# Patient Record
Sex: MALE
[De-identification: names, ages, dates, MRNs, and addresses within clinical notes are randomized per-mention and may not be internally consistent; named-entity substitution may affect disease eponyms.]

---

## 2020-08-02 ENCOUNTER — HOSPITAL ENCOUNTER (EMERGENCY)
Dept: HOSPITAL 97 - ER | Age: 65
Discharge: HOME | End: 2020-08-02
Payer: COMMERCIAL

## 2020-08-02 VITALS — TEMPERATURE: 98 F | DIASTOLIC BLOOD PRESSURE: 84 MMHG | SYSTOLIC BLOOD PRESSURE: 127 MMHG

## 2020-08-02 VITALS — OXYGEN SATURATION: 95 %

## 2020-08-02 DIAGNOSIS — K80.70: Primary | ICD-10-CM

## 2020-08-02 DIAGNOSIS — I10: ICD-10-CM

## 2020-08-02 DIAGNOSIS — Z88.1: ICD-10-CM

## 2020-08-02 LAB
ALBUMIN SERPL BCP-MCNC: 3.7 G/DL (ref 3.4–5)
ALP SERPL-CCNC: 78 U/L (ref 45–117)
ALT SERPL W P-5'-P-CCNC: 38 U/L (ref 12–78)
AST SERPL W P-5'-P-CCNC: 22 U/L (ref 15–37)
BUN BLD-MCNC: 8 MG/DL (ref 7–18)
GLUCOSE SERPLBLD-MCNC: 92 MG/DL (ref 74–106)
HCT VFR BLD CALC: 49.5 % (ref 39.6–49)
LIPASE SERPL-CCNC: 37 U/L (ref 73–393)
LYMPHOCYTES # SPEC AUTO: 1.9 K/UL (ref 0.7–4.9)
PMV BLD: 8.3 FL (ref 7.6–11.3)
POTASSIUM SERPL-SCNC: 3.9 MMOL/L (ref 3.5–5.1)
RBC # BLD: 5.67 M/UL (ref 4.33–5.43)

## 2020-08-02 PROCEDURE — 81003 URINALYSIS AUTO W/O SCOPE: CPT

## 2020-08-02 PROCEDURE — 80076 HEPATIC FUNCTION PANEL: CPT

## 2020-08-02 PROCEDURE — 82565 ASSAY OF CREATININE: CPT

## 2020-08-02 PROCEDURE — 85025 COMPLETE CBC W/AUTO DIFF WBC: CPT

## 2020-08-02 PROCEDURE — 36415 COLL VENOUS BLD VENIPUNCTURE: CPT

## 2020-08-02 PROCEDURE — 96375 TX/PRO/DX INJ NEW DRUG ADDON: CPT

## 2020-08-02 PROCEDURE — 83690 ASSAY OF LIPASE: CPT

## 2020-08-02 PROCEDURE — 99284 EMERGENCY DEPT VISIT MOD MDM: CPT

## 2020-08-02 PROCEDURE — 96361 HYDRATE IV INFUSION ADD-ON: CPT

## 2020-08-02 PROCEDURE — 80048 BASIC METABOLIC PNL TOTAL CA: CPT

## 2020-08-02 PROCEDURE — 74177 CT ABD & PELVIS W/CONTRAST: CPT

## 2020-08-02 PROCEDURE — 96374 THER/PROPH/DIAG INJ IV PUSH: CPT

## 2020-08-02 NOTE — ER
Nurse's Notes                                                                                     

 [facility]                                                                 

Name: Gary Valle                                                                                 

Age: 65 yrs                                                                                       

Sex: Male                                                                                         

: 1955                                                                                   

MRN: [id number]                                                                                   

Arrival Date: 2020                                                                          

Time: 15:52                                                                                       

Account#: [id number]                                                                            

Bed 13                                                                                            

Private MD:                                                                                       

Diagnosis: Calculus of gallbladder and bile duct without cholecystitis                            

                                                                                                  

Presentation:                                                                                     

                                                                                             

16:10 Chief complaint: Patient states: Nausea, diarrhea, light headed for 4 days. + gassy and ll1 

      acid reflux. + lightheaded and near syncope today. Light colored stools, no blood seen.     

      Coronavirus screen: Client denies travel out of the [place] in the last 14 days. diarrhea,     

      difficulty breathing, muscle pain, nausea, Client presents with at least one sign or        

      symptom that may indicate coronavirus-19. Standard/surgical mask placed on the client.      

      Ebola Screen: Patient denies travel to an Ebola-affected area in the 21 days before         

      illness onset. Initial Sepsis Screen: Does the patient meet any 2 criteria? No.             

      Patient's initial sepsis screen is negative. Risk Assessment: Do you want to hurt           

      yourself or someone else? Patient reports no desire to harm self or others. Onset of        

      symptoms was 2020.                                                                 

16:10 Method Of Arrival: Ambulatory                                                           ll1 

16:10 Acuity: MARIANELA 3                                                                           ll1 

17:59 Initial Sepsis Screen: Does the patient have a suspected source of infection? Yes:      ll1 

      Acute abdominal pain.                                                                       

                                                                                                  

Historical:                                                                                       

- Allergies:                                                                                      

16:14 Azithromycin;                                                                           ll1 

- PMHx:                                                                                           

16:14 Hypertension;                                                                           ll1 

                                                                                                  

- Immunization history:: Flu vaccine is not up to date.                                           

- Social history:: Smoking status: Patient denies any tobacco usage or history of.                

  Patient uses alcohol, only on a social basis. Patient/guardian denies using street              

  drugs, IV drugs.                                                                                

- Family history:: not pertinent.                                                                 

                                                                                                  

                                                                                                  

Screenin:58 Abuse screen: Denies threats or abuse. Nutritional screening: No deficits noted.        ll1 

      Tuberculosis screening: No symptoms or risk factors identified. Fall Risk IV access (20     

      points). Total Winter Fall Scale indicates No Risk (0-24 pts).                               

                                                                                                  

Assessment:                                                                                       

17:56 General: Appears in no apparent distress. Behavior is calm, cooperative, appropriate    ll1 

      for age. Pain: Complains of pain in upper abdomen Pain Quality of pain is described as      

      aching, Pain began 2-3 days ago. Neuro: Level of Consciousness is awake, alert, obeys       

      commands, Oriented to person, place, time, situation, Appropriate for age  are         

      equal bilaterally Moves all extremities. Full function Gait is steady, Speech is            

      normal, Facial symmetry appears normal, Pupils are PERRLA, Reports lightheaded, near        

      syncope feeling.. Cardiovascular: No deficits noted. Respiratory: No deficits noted.        

      GI: Abdomen is round Bowel sounds present X 4 quads. Abd is soft and non tender X 4         

      quads. Reports upper abdominal pain, bloating, diarrhea, gaseousness, indigestion,          

      nausea.                                                                                     

19:15 Reassessment: Patient and/or family updated on plan of care and expected duration. Pain ph  

      level reassessed. Patient denies pain at this time. Patient states feeling better.          

      Patient states symptoms have improved. Reassessment: Patient is alert, oriented x 3,        

      equal unlabored respirations, skin warm/dry/pink. General: Appears in no apparent           

      distress. Behavior is calm, cooperative.                                                    

                                                                                                  

Vital Signs:                                                                                      

16:10  / 85; Pulse 68; Resp 17; Temp 98.5; Pulse Ox 95% ; Weight 117.93 kg; Height 5    ll1 

      ft. 8 in. (172.72 cm); Pain 3/10;                                                           

19:15  / 84; Pulse 72; Resp 16; Temp 98.0(O); Pulse Ox 95% on R/A; Pain 0/10;           ph  

16:10 Body Mass Index 39.53 (117.93 kg, 172.72 cm)                                            ll1 

                                                                                                  

ED Course:                                                                                        

15:52 Patient arrived in ED.                                                                  fj1 

16:13 Triage completed.                                                                       ll1 

16:15 Arm band placed on Patient notified of wait time.                                       ll1 

17:05 Bebe Montelongo MD is Attending Physician.                                           ma2 

17:40 Inserted saline lock: 20 gauge in right antecubital area, using aseptic technique.      ll1 

      Blood collected.                                                                            

17:52 Azalea Harper, RN is Primary Nurse.                                                    ph  

18:18 CT Abd/Pelvis - IV Contrast Only In Process Unspecified.                                EDMS

19:02 Tristan Mauricio MD is Referral Physician.                                               ma2 

19:15 Bed in low position. Call light in reach. Side rails up X 1.                            ph  

19:15 No provider procedures requiring assistance completed. IV discontinued, intact,         ph  

      bleeding controlled, No redness/swelling at site. Pressure dressing applied.                

                                                                                                  

Administered Medications:                                                                         

17:55 Drug: NS 0.9% 2000 ml Route: IV; Rate: 1 bolus; Site: right antecubital;                ll1 

19:00 Follow up: Response: No adverse reaction; IV Status: Completed infusion; IV Intake:     ph  

      1000ml                                                                                      

17:56 Drug: Zofran (Ondansetron) 4 mg Route: IVP; Site: right antecubital;                    ll1 

19:00 Follow up: Response: No adverse reaction                                                ph  

17:56 Drug: Pepcid 20 mg Route: IVP; Site: right antecubital;                                 ll1 

19:00 Follow up: Response: No adverse reaction                                                ph  

                                                                                                  

                                                                                                  

Intake:                                                                                           

19:00 IV: 1000ml; Total: 1000ml.                                                              ph  

                                                                                                  

Outcome:                                                                                          

19:02 Discharge ordered by MD.                                                                ma2 

19:15 Discharged to home ambulatory.                                                          ph  

19:15 Condition: good                                                                             

19:15 Discharge instructions given to patient, Instructed on discharge instructions, follow       

      up and referral plans. medication usage, Demonstrated understanding of instructions,        

      follow-up care, medications, Prescriptions given X 2.                                       

19:17 Patient left the ED.                                                                    ph  

                                                                                                  

Signatures:                                                                                       

Dispatcher [employer]                           EDAzalea Brito RN                      RN   ph                                                   

Bebe Montelongo MD MD ma2 James, Frank                                 Tatiana Briceno RN                       RN   [facility]                                                  

                                                                                                  

**************************************************************************************************

## 2020-08-02 NOTE — EDPHYS
Physician Documentation                                                                           

 [facility]                                                                 

Name: Gary Valle                                                                                 

Age: 65 yrs                                                                                       

Sex: Male                                                                                         

: 1955                                                                                   

MRN: [id number]                                                                                   

Arrival Date: 2020                                                                          

Time: 15:52                                                                                       

Account#: [id number]                                                                            

Bed 13                                                                                            

Private MD:                                                                                       

ED Physician Bebe Montelongo                                                                    

HPI:                                                                                              

                                                                                             

17:26 This 65 yrs old  Male presents to ER via Ambulatory with complaints of Nausea,  ma2 

      Diarrhea, LIGHT HEADED.                                                                     

17:26 The patient presents to the emergency department with nausea, vomiting, diarrhea.       ma2 

      Onset: The symptoms/episode began/occurred gradually, 1 week(s) ago. Associated signs       

      and symptoms: Pertinent positives: abdominal pain, Pertinent negatives: belching,           

      diarrhea, fever, flatulence, GI bleeding. Severity of symptoms: At their worst the          

      symptoms were moderate in the emergency department the symptoms have improved. The          

      patient has not experienced similar symptoms in the past.                                   

                                                                                                  

Historical:                                                                                       

- Allergies:                                                                                      

16:14 Azithromycin;                                                                           ll1 

- PMHx:                                                                                           

16:14 Hypertension;                                                                           ll1 

                                                                                                  

- Immunization history:: Flu vaccine is not up to date.                                           

- Social history:: Smoking status: Patient denies any tobacco usage or history of.                

  Patient uses alcohol, only on a social basis. Patient/guardian denies using street              

  drugs, IV drugs.                                                                                

- Family history:: not pertinent.                                                                 

                                                                                                  

                                                                                                  

ROS:                                                                                              

17:26 Constitutional: Negative for fever, chills, and weight loss.                            ma2 

17:26 All other systems are negative.                                                             

                                                                                                  

Exam:                                                                                             

17:26 Constitutional:  This is a well developed, well nourished patient who is awake, alert,  ma2 

      and in no acute distress. ENT:  Nares patent. No nasal discharge, no septal                 

      abnormalities noted.  Tympanic membranes are normal and external auditory canals are        

      clear.  Oropharynx with no redness, swelling, or masses, exudates, or evidence of           

      obstruction, uvula midline.  Mucous membranes moist. Neck:  Trachea midline, no             

      thyromegaly or masses palpated, and no cervical lymphadenopathy.  Supple, full range of     

      motion without nuchal rigidity, or vertebral point tenderness.  No Meningismus.             

      Chest/axilla:  Normal chest wall appearance and motion.  Nontender with no deformity.       

      No lesions are appreciated. Cardiovascular:  Regular rate and rhythm with a normal S1       

      and S2.  No gallops, murmurs, or rubs.  Normal PMI, no JVD.  No pulse deficits.             

      Respiratory:  Lungs have equal breath sounds bilaterally, clear to auscultation and         

      percussion.  No rales, rhonchi or wheezes noted.  No increased work of breathing, no        

      retractions or nasal flaring. Abdomen/GI:  Soft, non-tender, with normal bowel sounds.      

      No distension or tympany.  No guarding or rebound.  No evidence of tenderness               

      throughout. Skin:  Warm, dry with normal turgor.  Normal color with no rashes, no           

      lesions, and no evidence of cellulitis. MS/ Extremity:  Pulses equal, no cyanosis.          

      Neurovascular intact.  Full, normal range of motion. Neuro:  Awake and alert, GCS 15,       

      oriented to person, place, time, and situation.  Cranial nerves II-XII grossly intact.      

      Motor strength 5/5 in all extremities.  Sensory grossly intact.  Cerebellar exam            

      normal.  Normal gait.                                                                       

                                                                                                  

Vital Signs:                                                                                      

16:10  / 85; Pulse 68; Resp 17; Temp 98.5; Pulse Ox 95% ; Weight 117.93 kg; Height 5    ll1 

      ft. 8 in. (172.72 cm); Pain 3/10;                                                           

19:15  / 84; Pulse 72; Resp 16; Temp 98.0(O); Pulse Ox 95% on R/A; Pain 0/10;           ph  

16:10 Body Mass Index 39.53 (117.93 kg, 172.72 cm)                                            ll1 

                                                                                                  

MDM:                                                                                              

17:05 Patient medically screened.                                                             [facility] 

17:26 Differential diagnosis: gastritis, cholecystitis, pancreatitis, appendicitis.           [facility] 

19:01 Data reviewed: vital signs, nurses notes. Counseling: I had a detailed discussion with  [facility] 

      the patient and/or guardian regarding: the historical points, exam findings, and any        

      diagnostic results supporting the discharge/admit diagnosis, the presence of at least       

      one elevated blood pressure reading (>120/80) during this emergency department visit,       

      the need for outpatient follow up. Response to treatment: the patient's symptoms have       

      resolved after treatment.                                                                   

                                                                                                  

                                                                                             

17:26 Order name: Basic Metabolic Panel; Complete Time: 18:20                                 [facility] 

                                                                                             

17:26 Order name: CBC with Diff; Complete Time: 18:49                                         [facility] 

                                                                                             

17: Order name: Hepatic Function; Complete Time: 18:20                                      [facility] 

                                                                                             

17:26 Order name: Lipase; Complete Time: 18:20                                                ma2 

                                                                                             

18:06 Order name: CREATININE WHOLE BLOOD; Complete Time: 18:20                                [facility]

                                                                                             

18:38 Order name: Urine Dipstick--Ancillary (enter results); Complete Time: 18:49             tt3 

                                                                                             

17:26 Order name: IV Saline Lock; Complete Time: 17:41                                        ma2 

                                                                                             

17:26 Order name: Labs collected and sent; Complete Time: 17:41                               ma2 

                                                                                             

17:26 Order name: CT Abd/Pelvis - IV Contrast Only; Complete Time: 18:49                      ma2 

                                                                                                  

Administered Medications:                                                                         

17:55 Drug: NS 0.9% 2000 ml Route: IV; Rate: 1 bolus; Site: right antecubital;                1 

19:00 Follow up: Response: No adverse reaction; IV Status: Completed infusion; IV Intake:     ph  

      1000ml                                                                                      

17:56 Drug: Zofran (Ondansetron) 4 mg Route: IVP; Site: right antecubital;                    ll1 

19:00 Follow up: Response: No adverse reaction                                                ph  

17:56 Drug: Pepcid 20 mg Route: IVP; Site: right antecubital;                                 ll1 

19:00 Follow up: Response: No adverse reaction                                                ph  

                                                                                                  

                                                                                                  

Disposition:                                                                                      

20 19:02 Discharged to Home. Impression: Calculus of gallbladder and bile duct without      

  cholecystitis.                                                                                  

- Condition is Stable.                                                                            

- Discharge Instructions: Cholelithiasis, Easy-to-Read.                                           

- Prescriptions for Zofran 4 mg Oral Tablet - take 1 tablet by ORAL route every 12                

  hours As needed; 20 tablet. Diclofenac Sodium 75 mg Oral Tablet Sustained Release -             

  take 1 tablet by ORAL route 2 times per day; 30 tablet.                                         

- Medication Reconciliation Form, Thank You Letter, Antibiotic Education, Prescription            

  Opioid Use form.                                                                                

- Follow up: Tristan Mauricio MD; When: Tomorrow; Reason: If symptoms return,                      

  Continuance of care.                                                                            

                                                                                                  

                                                                                                  

                                                                                                  

Signatures:                                                                                       

Dispatcher [employer]                           [facility]                                                 

Azalea Harper RN                      RN   ph                                                   

Bebe Montelongo MD MD   ma2                                                  

Tatiana Wiggins RN                       RN   ll1                                                  

                                                                                                  

Corrections: (The following items were deleted from the chart)                                    

19:17 19:02 2020 19:02 Discharged to Home. Impression: Calculus of gallbladder and bile ph  

      duct without cholecystitis. Condition is Stable. Forms are Medication Reconciliation        

      Form, Thank You Letter, Antibiotic Education, Prescription Opioid Use. Follow up: Tristan Mauricio; When: Tomorrow; Reason: If symptoms return, Continuance of care. ma2               

                                                                                                  

**************************************************************************************************

## 2020-08-02 NOTE — RAD REPORT
EXAM DESCRIPTION:  CT - Abdomen   Pelvis W Contrast - 8/2/2020 6:18 pm

 

CLINICAL HISTORY:  Abdominal pain

 

COMPARISON:  none.

 

TECHNIQUE:  Computed axial tomography of the abdomen pelvis was obtained. 100 cc Isovue-300 was admin
istered intravenously. Oral contrast was not requested which limits evaluation of bowel.

 

All CT scans are performed using dose optimization technique as appropriate and may include automated
 exposure control or mA/KV adjustment according to patient size.

 

FINDINGS:  Fatty liver

 

Cholelithiasis without gallbladder wall thickening

 

Spleen, pancreas, adrenal and kidneys appear unremarkable.

 

There is no evidence of diverticulitis. Normal appendix

 

Spondylosis involves lumbar spine resulting in spinal stenosis. Small umbilical hernia. Small inguina
l hernias contain fat

 

The prostate gland is mildly enlarged

 

IMPRESSION:  Cholelithiasis without evidence cholecystitis.

## 2021-01-25 NOTE — XMS REPORT
Continuity of Care Document

                           Created on:2021



Patient:AURELIO VALLE

Sex:Male

:1955

External Reference #:[id number]





Demographics







                          Address                   [address] [place] [address]

 

                          Home Phone                [phone] CELL

 

                          Work Phone                [phone]

 

                          Mobile Phone              [phone]

 

                          Email Address             [email or web address]

 

                          Preferred Language        English

 

                          Marital Status            Unknown

 

                          [employer] Affiliation     Unknown

 

                          Race                      Unknown

 

                          Additional Race(s)        Unavailable



                                                    White

 

                          Ethnic Group              Unknown









Author







                          Organization              [facility]

t

 

                          Address                   [address]



                                                    [place] [address]

 

                          Phone                     [phone]









Support







                Name            Relationship    Address         Phone

 

                Hattie Valle     Spouse          [address] [phone]



                                                [place] [address] 









Care Team Providers







                    Name                Role                Phone

 

                    Tristan Mauricio MD Attending Clinician [phone]

 

                    Only,  Test         Attending Clinician Unavailable

 

                    Saul ORTEZ  T       Attending Clinician Unavailable

 

                    GLENN Mauricio MD      Admitting Clinician [phone]









Problems

This patient has no known problems.



Allergies, Adverse Reactions, Alerts

This patient has no known allergies or adverse reactions.



Medications

This patient has no known medications.



Procedures

This patient has no known procedures.



Encounters







        Start   End     Encounter Admission Attending Care    Care    Encounter 

Source



        Date/Time Date/Time Type    Type    Clinicians Facility Department ID   

   

 

        2020 [facility]    [phone] [id number]

[id number] 



        08:36:00 13:18:00 Encounter         Tristan Browne [id number]        

 



                                                [place] [id number]         



                                                S[facility] [id number]         



                                                [place]  071             

 

        2020 Laboratory         Only, [facility]    [id number]

[id number] 



        11:44:31 11:59:31 Only            Test    [place] [id number]         



                                                [place] [id number]         



                                                [place]  [phone]         



                                                        353             

 

        2020 Letter          ERIK Hughes    [id number]

[id number] 



        00:00:00 00:00:00 (Out)           Abeba REILLY   [id number]         



                                                [facility] [id number]         



                                                        [phone]         



                                                        019             







Results

This patient has no known results.

## 2021-01-25 NOTE — ER
Nurse's Notes                                                                                     

 [facility]                                                                 

Name: Gary Valle                                                                                 

Age: 65 yrs                                                                                       

Sex: Male                                                                                         

: 1955                                                                                   

MRN: [id number]                                                                                   

Arrival Date: 2021                                                                          

Time: 11:37                                                                                       

Account#: [id number]                                                                            

Bed 19                                                                                            

Private MD:                                                                                       

Diagnosis: Urticaria, unspecified                                                                 

                                                                                                  

Presentation:                                                                                     

                                                                                             

12:01 Chief complaint: Seen at [place] for allergic reaction 2 days ago, given montelukast,     hb  

      prednisone, and Pepcid, reports hives returned this morning but he only took                

      prednisone. Coronavirus screen: At this time, the client does not indicate any symptoms     

      associated with coronavirus-19. Ebola Screen: No symptoms or risks identified at this       

      time. Initial Sepsis Screen: Does the patient meet any 2 criteria? No. Patient's            

      initial sepsis screen is negative. Does the patient have a suspected source of              

      infection? No. Patient's initial sepsis screen is negative. Risk Assessment: Do you         

      want to hurt yourself or someone else? Patient reports no desire to harm self or            

      others. Onset of symptoms was 2021.                                             

12:01 Method Of Arrival: Ambulatory                                                           hb  

12:01 Acuity: MARIANELA 4                                                                           hb  

                                                                                                  

Historical:                                                                                       

- Allergies:                                                                                      

12:03 Azithromycin;                                                                           hb  

- PMHx:                                                                                           

12:03 Hypertension;                                                                           hb  

                                                                                                  

- Immunization history:: Adult Immunizations up to date.                                          

- Social history:: Smoking status: Patient denies any tobacco usage or history of.                

                                                                                                  

                                                                                                  

Screenin:41 Abuse screen: Denies threats or abuse. Nutritional screening: No deficits noted.        vg1 

      Tuberculosis screening: No symptoms or risk factors identified. Fall Risk No fall in        

      past 12 months (0 pts). No secondary diagnosis (0 pts). IV access (20 points).              

      Ambulatory Aid- None/Bed Rest/Nurse Assist (0 pts). Gait- Normal/Bed Rest/Wheelchair (0     

      pts) Mental Status- Oriented to own ability (0 pts). Total Winter Fall Scale indicates       

      No Risk (0-24 pts).                                                                         

                                                                                                  

Assessment:                                                                                       

12:26 Reassessment: Patient appears in no apparent distress at this time. No changes from     tw2 

      previously documented assessment. Patient and/or family updated on plan of care and         

      expected duration. Pain level reassessed. Patient is alert, oriented x 3, equal             

      unlabored respirations, skin warm/dry/pink.                                                 

13:30 General: Appears in no apparent distress. comfortable, Behavior is calm, cooperative.   vg1 

      Pain: Complains of pain in chest, back and neck. Pain currently is 4 out of 10 on a         

      pain scale. Neuro: Level of Consciousness is awake, alert, obeys commands, Oriented to      

      person, place, time, situation. Cardiovascular: Patient's skin is warm and dry.             

      Respiratory: Airway is patent Respiratory effort is even, unlabored, Respiratory            

      pattern is regular, symmetrical, Breath sounds are clear bilaterally. GI: No signs          

      and/or symptoms were reported involving the gastrointestinal system. : No signs           

      and/or symptoms were reported regarding the genitourinary system. EENT: No signs and/or     

      symptoms were reported regarding the EENT system. Derm: Rash noted that is itchy,           

      raised, urticaria, Reports itching. Musculoskeletal: Circulation, motion, and sensation     

      intact.                                                                                     

                                                                                                  

Vital Signs:                                                                                      

12:01  / 84; Pulse 69; Resp 16; Temp 97.1; Pulse Ox 100% on R/A; Weight 111.13 kg;      hb  

      Height 5 ft. 6 in. (167.64 cm); Pain 5/10;                                                  

12:25  / 87; Pulse 68; Resp 18; Temp 98.2(TE); Pulse Ox 97% on R/A;                     tw2 

13:30  / 101; Pulse 60; Resp 16; Pulse Ox 97% on R/A;                                   vg1 

12:01 Body Mass Index 39.54 (111.13 kg, 167.64 cm)                                            hb  

                                                                                                  

ED Course:                                                                                        

11:37 Patient arrived in ED.                                                                  ds1 

12:03 Triage completed.                                                                       hb  

12:03 Arm band placed on.                                                                     hb  

13:28 Tono Colbert NP is PHCP.                                                           pm1 

13:28 Ciaran Brar MD is Attending Physician.                                                pm1 

13:32 Millicent Madrid, NEELIMA is Primary Nurse.                                                  vg1 

13:41 Patient has correct armband on for positive identification. Bed in low position. Call   vg1 

      light in reach.                                                                             

14:08 Inserted saline lock: 20 gauge in right antecubital area, using aseptic technique.      vg1 

14:24 No provider procedures requiring assistance completed. IV discontinued, intact,         vg1 

      bleeding controlled, No redness/swelling at site. Pressure dressing applied.                

                                                                                                  

Administered Medications:                                                                         

12:14 Drug: Benadryl 50 mg Route: PO;                                                         hb  

14:23 Follow up: Response: No adverse reaction                                                vg1 

14:08 Drug: Pepcid 20 mg Route: IVP; Site: right antecubital;                                 vg1 

14:23 Follow up: Response: No adverse reaction                                                vg1 

14:08 Drug: Decadron - Dexamethasone 10 mg Route: IVP; Site: right antecubital;               vg1 

14:23 Follow up: Response: No adverse reaction                                                vg1 

                                                                                                  

                                                                                                  

Outcome:                                                                                          

14:05 Discharge ordered by MD.                                                                pm1 

14:24 Discharged to home ambulatory.                                                          vg1 

14:24 Condition: stable                                                                           

14:24 Discharge instructions given to patient, Instructed on discharge instructions, follow       

      up and referral plans. Demonstrated understanding of instructions, follow-up care.          

14:24 Patient left the ED.                                                                    vg1 

                                                                                                  

Signatures:                                                                                       

Ynes Mota                                ds1                                                  

Tono Colbert, ALTON                    NP   pm1                                                  

Massiel Bearden RN                     RN                                                      

Kaycee Dodson RN                          RN   tw2                                                  

Millicent Madrid RN                    RN   vg1                                                  

                                                                                                  

**************************************************************************************************

## 2021-01-25 NOTE — EDPHYS
Physician Documentation                                                                           

 [facility]                                                                 

Name: Gary Valle                                                                                 

Age: 65 yrs                                                                                       

Sex: Male                                                                                         

: 1955                                                                                   

MRN: [id number]                                                                                   

Arrival Date: 2021                                                                          

Time: 11:37                                                                                       

Account#: [id number]                                                                            

Bed 19                                                                                            

Private MD:                                                                                       

ED Physician Ciaran Brar                                                                         

HPI:                                                                                              

                                                                                             

13:40 This 65 yrs old  Male presents to ER via Ambulatory with complaints of Hives.   pm1 

13:40 The patient's rash thought to be caused by an unknown cause, but possibly from his cat. pm1 

      He noticed that his hands started itching after petting his cat this AM.                    

13:40 The rash is located on the back, chest and neck. The rash can be described as           pm1 

      urticarial. Onset: The symptoms/episode began/occurred this morning. Associated signs       

      and symptoms: Pertinent positives: itching, dizziness, Pertinent negatives: difficulty      

      breathing, swelling of lips, swelling of throat, swelling of tongue, chest pain,            

      shortness of breath. Severity of symptoms: in the emergency department the symptoms         

      have improved with Benadryl given in triage. Treatment given at home: Patient               

      montelukast, prednisone, and pepcid. Patient was seen at [place] 2 days ago for the same      

      symptoms. Patient reports unaware of anything new or changed prior to hives. Has not        

      changed routine since rash. Took his first dose of prednisone this AM. At [place] recalls     

      being given steroid and Benadryl IV.                                                        

13:40 Patient had appointment with allergist this AM, but did not go.                         pm1 

                                                                                                  

Historical:                                                                                       

- Allergies:                                                                                      

12:03 Azithromycin;                                                                           hb  

- PMHx:                                                                                           

12:03 Hypertension;                                                                           hb  

                                                                                                  

- Immunization history:: Adult Immunizations up to date.                                          

- Social history:: Smoking status: Patient denies any tobacco usage or history of.                

                                                                                                  

                                                                                                  

ROS:                                                                                              

13:40 Constitutional: Negative for fever, chills, and weight loss, ENT: Negative for injury,  pm1 

      pain, and discharge, Neck: Negative for injury, pain, and swelling, Cardiovascular:         

      Negative for chest pain, palpitations, and edema, Respiratory: Negative for shortness       

      of breath, cough, wheezing, and pleuritic chest pain, Abdomen/GI: Negative for              

      abdominal pain, nausea, vomiting, diarrhea, and constipation, Back: Negative for injury     

      and pain, MS/Extremity: Negative for injury and deformity.                                  

13:40 Neuro: Negative for headache, weakness, numbness, tingling, and seizure.                    

13:40 Skin: Positive for rash, of the neck and chest and back.                                    

                                                                                                  

Exam:                                                                                             

13:40 Constitutional:  This is a well developed, well nourished patient who is awake, alert,  pm1 

      and in no acute distress. Head/Face:  Normocephalic, atraumatic.                            

13:40 Cardiovascular: Exam negative for  acute changes, Rate: normal, Rhythm: regular,            

      Pulses: no pulse deficits are appreciated.                                                  

13:40 Respiratory: Exam negative for  acute changes, respiratory distress, shortness of           

      breath.                                                                                     

13:40 Skin: Appearance: normal except for affected area, consistent with  urticaria, on the       

      neck and chest and back.                                                                    

13:40 Neuro: Exam negative for acute changes, Orientation: is normal, Mentation: is normal,       

      Motor: is normal.                                                                           

                                                                                                  

Vital Signs:                                                                                      

12:01  / 84; Pulse 69; Resp 16; Temp 97.1; Pulse Ox 100% on R/A; Weight 111.13 kg;      hb  

      Height 5 ft. 6 in. (167.64 cm); Pain 5/10;                                                  

12:25  / 87; Pulse 68; Resp 18; Temp 98.2(TE); Pulse Ox 97% on R/A;                     tw2 

13:30  / 101; Pulse 60; Resp 16; Pulse Ox 97% on R/A;                                   vg1 

12:01 Body Mass Index 39.54 (111.13 kg, 167.64 cm)                                            hb  

                                                                                                  

MDM:                                                                                              

13:28 Patient medically screened.                                                             pm1 

13:37 Data reviewed: vital signs. Data interpreted: Pulse oximetry: on room air is 97 %.      pm1 

      Interpretation: normal.                                                                     

14:04 Counseling: I had a detailed discussion with the patient and/or guardian regarding: the pm1 

      historical points, exam findings, and any diagnostic results supporting the                 

      discharge/admit diagnosis, the need for outpatient follow up, for definitive care, an       

      allergy/immunology specialist, to return to the emergency department if symptoms worsen     

      or persist or if there are any questions or concerns that arise at home.                    

14:21 ED course: Clarified patient's prior prescriptions. He took just one 20 mg pill of      pm1 

      prednisone this AM. He did not understand to take his 60 mg daily. Informed to start        

      that tomorrow since I gave him Decadron here in the ER. Patient rescheduled appointment     

      with allergist for tomorrow at noon.                                                        

                                                                                                  

                                                                                             

13:39 Order name: IV Saline Lock; Complete Time: 14:08                                        pm1 

                                                                                                  

Administered Medications:                                                                         

12:14 Drug: Benadryl 50 mg Route: PO;                                                         hb  

14:23 Follow up: Response: No adverse reaction                                                vg1 

14:08 Drug: Pepcid 20 mg Route: IVP; Site: right antecubital;                                 vg1 

14:23 Follow up: Response: No adverse reaction                                                vg1 

14:08 Drug: Decadron - Dexamethasone 10 mg Route: IVP; Site: right antecubital;               vg1 

14:23 Follow up: Response: No adverse reaction                                                vg1 

                                                                                                  

                                                                                                  

Disposition:                                                                                      

14:36 Co-signature as Attending Physician, Ciaran Brar MD.                                    rn  

                                                                                                  

Disposition:                                                                                      

21 14:05 Discharged to Home. Impression: Urticaria, unspecified.                            

- Condition is Stable.                                                                            

- Discharge Instructions: Hives.                                                                  

                                                                                                  

- Medication Reconciliation Form, Thank You Letter, Antibiotic Education, Prescription            

  Opioid Use form.                                                                                

- Follow up: Emergency Department; When: As needed; Reason: Worsening of condition.               

  Follow up: Private Physician; When: 2 - 3 days; Reason: Recheck today's complaints,             

  Continuance of care, Re-evaluation by your physician.                                           

- Problem is new.                                                                                 

- Symptoms have improved.                                                                         

                                                                                                  

                                                                                                  

                                                                                                  

Signatures:                                                                                       

Ciaran Brar MD MD rn Marinas, Patrick, ALTON                    NP   pm1                                                  

Massiel Bearden, RN                     RN   Millicent Tolbert RN                    RN   vg1                                                  

                                                                                                  

Corrections: (The following items were deleted from the chart)                                    

14:24 14:05 2021 14:05 Discharged to Home. Impression: Urticaria, unspecified.          vg1 

      Condition is Stable. Forms are Medication Reconciliation Form, Thank You Letter,            

      Antibiotic Education, Prescription Opioid Use. Follow up: Emergency Department; When:       

      As needed; Reason: Worsening of condition. Follow up: Private Physician; When: 2 - 3        

      days; Reason: Recheck today's complaints, Continuance of care, Re-evaluation by your        

      physician. Problem is new. Symptoms have improved. pm1                                      

                                                                                                  

**************************************************************************************************